# Patient Record
Sex: FEMALE | Race: OTHER
[De-identification: names, ages, dates, MRNs, and addresses within clinical notes are randomized per-mention and may not be internally consistent; named-entity substitution may affect disease eponyms.]

---

## 2018-11-04 ENCOUNTER — HOSPITAL ENCOUNTER (EMERGENCY)
Dept: HOSPITAL 80 - FED | Age: 43
Discharge: HOME | End: 2018-11-04
Payer: MEDICAID

## 2018-11-04 VITALS — DIASTOLIC BLOOD PRESSURE: 64 MMHG | SYSTOLIC BLOOD PRESSURE: 110 MMHG

## 2018-11-04 DIAGNOSIS — E86.9: ICD-10-CM

## 2018-11-04 DIAGNOSIS — Z90.49: ICD-10-CM

## 2018-11-04 DIAGNOSIS — R10.31: Primary | ICD-10-CM

## 2018-11-04 LAB — PLATELET # BLD: 214 10^3/UL (ref 150–400)

## 2018-11-04 NOTE — EDPHY
H & P


Stated Complaint: R abd pain since Fri; no n/v/d; feels bloated


Time Seen by Provider: 11/04/18 10:55


HPI/ROS: 





CHIEF COMPLAINT:  Right lower quadrant pain





HISTORY OF PRESENT ILLNESS:  43-year-old female presents with right lower 

quadrant pain.  Onset of generalized abdominal pain and bloating 2 days ago.  

The pain is moderate and has migrated to the right lower quadrant.  Associated 

with dysuria, lack of appetite and chills.  No nausea, vomiting or diarrhea.  

No prior similar symptoms.





REVIEW OF SYSTEMS:  complete 10 point ROS reviewed and is negative except for 

the noted elements in the HPI








- Personal History


LMP (Females 10-55): Over 28 Days Ago


Current Tetanus Diphtheria and Acellular Pertussis (TDAP): Yes


Tetanus Vaccine Date: 2011





- Medical/Surgical History


Hx Asthma: No


Hx Chronic Respiratory Disease: No


Hx Diabetes: No


Hx Cardiac Disease: No


Hx Renal Disease: No


Hx Cirrhosis: No


Hx Alcoholism: No


Hx HIV/AIDS: No


Hx Splenectomy or Spleen Trauma: No


Other PMH: HX:  Cholecystectomy, OBESITY, Seizure disorder, CHRONIC NECK AND 

ARM PAIN, Csection





- Social History


Smoking Status: Never smoked


Drug Use: None





- Physical Exam


Exam: 





General Appearance:  Alert, pleasant


Eyes:  Pupils equal and round, no conjunctival pallor


ENT, Mouth:  Mucous membranes moist


Neck:  Normal inspection


Respiratory:  Lungs are clear to auscultation


Cardiovascular:  Regular rate and rhythm


Gastrointestinal:  Abdomen is soft, right lower quadrant tenderness, no 

peritoneal signs


Neurological:  A&O, nonfocal exam


Skin:  Warm and dry, no rash


Extremities:  Normal inspection


Psychiatric:  Mood and affect normal





Constitutional: 


 Initial Vital Signs











Temperature (C)  36.6 C   11/04/18 10:48


 


Heart Rate  61   11/04/18 10:48


 


Respiratory Rate  18   11/04/18 10:48


 


Blood Pressure  123/64 H  11/04/18 10:48


 


O2 Sat (%)  98   11/04/18 10:48








 











O2 Delivery Mode               Room Air














Allergies/Adverse Reactions: 


 





hydrocodone Allergy (Unknown, Verified 11/04/18 10:47)


 


naproxen Allergy (Unknown, Verified 11/04/18 10:53)


 








Home Medications: 














 Medication  Instructions  Recorded


 


Lamictal    08/26/12


 


Topiramate  05/18/14


 


Albuterol [Proventil Neb] 3 ml IH 11/04/18


 


Ondansetron Odt [Zofran Odt 4 mg 4 mg PO Q4 PRN #6 tab 11/04/18





(*)]  














Medical Decision Making





- Diagnostics


Imaging Results: 


 Imaging Impressions





Abdomen CT  11/04/18 12:01


Impression: 


1. Normal appendix. No appendicitis.


2. Status post cholecystectomy. No biliary dilation.


3. Hepatic steatosis.


4. Constipation. 


 


Findings discussed with emergency department physician, Dee Knight MD on 

November 4, 2018 at 12:57 p.m.











Imaging: Discussed imaging studies w/ On call Radiologist, I viewed and 

interpreted images myself


ED Course/Re-evaluation: 





This patient presents with right lower quadrant pain.  Clinical presentation 

concerning for acute appendicitis.  CT scan of the abdomen pelvis ordered.  

Morphine 4 mg IV given for pain control.  Discussed with patient allergy to 

Naprosyn.  She develops an aura of seizure with Naprosyn, no true allergic 

reaction.





CT scan reveals no evidence of appendicitis.  Results discussed with the 

patient.  Feels much better overall.  Nausea has resolved and abdominal pain is 

very slight.  Abdomen is soft with minimal right lower quadrant tenderness.  

Given normal CT scan, no leukocytosis and normal urinalysis, will discharge 

home.  The patient was encouraged to follow up with her PCP in 1-2 days for 

recheck.  Will take ibuprofen as needed for abdominal pain.  Zofran prescribed 

for nausea.


Differential Diagnosis: 





Differential diagnosis includes though it is not limited to appendicitis, 

cholecystitis, diverticulitis, pyelonephritis, bowel perforation, small bowel 

obstruction.





- Data Points


Laboratory Results: 


 Laboratory Results





 11/04/18 11:10 





 11/04/18 11:10 





 











  11/04/18 11/04/18 11/04/18





  11:30 11:10 11:10


 


WBC      





    


 


RBC      





    


 


Hgb      





    


 


POC Hgb  15.0 gm/dL gm/dL    





   (12.6-16.3)   


 


Hct      





    


 


POC Hct  44 % %    





   (38-47)   


 


MCV      





    


 


MCH      





    


 


MCHC      





    


 


RDW      





    


 


Plt Count      





    


 


MPV      





    


 


Neut % (Auto)      





    


 


Lymph % (Auto)      





    


 


Mono % (Auto)      





    


 


Eos % (Auto)      





    


 


Baso % (Auto)      





    


 


Nucleat RBC Rel Count      





    


 


Absolute Neuts (auto)      





    


 


Absolute Lymphs (auto)      





    


 


Absolute Monos (auto)      





    


 


Absolute Eos (auto)      





    


 


Absolute Basos (auto)      





    


 


Absolute Nucleated RBC      





    


 


Immature Gran %      





    


 


Immature Gran #      





    


 


POC Sodium  140 mEq/L mEq/L    





   (135-145)   


 


Sodium      





    


 


POC Potassium  4.2 mEq/L mEq/L    





   (3.3-5.0)   


 


Potassium      





    


 


POC Chloride  109 mEq/L mEq/L    





   ()   


 


Chloride      





    


 


Carbon Dioxide      





    


 


Anion Gap      





    


 


POC BUN  10 mg/dL mg/dL    





   (7-23)   


 


BUN      





    


 


Creatinine      





    


 


POC Creatinine  0.6 mg/dL mg/dL    





   (0.6-1.0)   


 


Estimated GFR      





    


 


Glucose      





    


 


POC Glucose  88 mg/dL mg/dL    





   ()   


 


Calcium      





    


 


Total Bilirubin      





    


 


Conjugated Bilirubin      





    


 


Unconjugated Bilirubin      





    


 


AST      





    


 


ALT      





    


 


Alkaline Phosphatase      





    


 


Total Protein      





    


 


Albumin      





    


 


Lipase      





    


 


Beta HCG, Qual      NEGATIVE 





    


 


Urine Color    YELLOW   





    


 


Urine Appearance    MODERATELY TURBID   





    


 


Urine pH    5.0   





    (5.0-7.5)  


 


Ur Specific Gravity    1.027   





    (1.002-1.030)  


 


Urine Protein    1+  H   





    (NEGATIVE)  


 


Urine Ketones    NEGATIVE   





    (NEGATIVE)  


 


Urine Blood    NEGATIVE   





    (NEGATIVE)  


 


Urine Nitrate    NEGATIVE   





    (NEGATIVE)  


 


Urine Bilirubin    NEGATIVE   





    (NEGATIVE)  


 


Urine Urobilinogen    NEGATIVE EU EU  





    (0.2-1.0)  


 


Ur Leukocyte Esterase    NEGATIVE   





    (NEGATIVE)  


 


Urine RBC    1-3 /hpf /hpf  





    (0-3)  


 


Urine WBC    1-3 /hpf /hpf  





    (0-3)  


 


Ur Epithelial Cells    2+ /lpf H /lpf  





    (NONE-1+)  


 


Urine Bacteria    TRACE /hpf H /hpf  





    (NONE SEEN)  


 


Urine Mucus    TRACE /lpf /lpf  





    (NONE-1+)  


 


Urine Glucose    NEGATIVE   





    (NEGATIVE)  














  11/04/18 11/04/18





  11:10 11:10


 


WBC    8.60 10^3/uL 10^3/uL





    (3.80-9.50) 


 


RBC    4.87 10^6/uL 10^6/uL





    (4.18-5.33) 


 


Hgb    14.2 g/dL g/dL





    (12.6-16.3) 


 


POC Hgb    





   


 


Hct    42.0 % %





    (38.0-47.0) 


 


POC Hct    





   


 


MCV    86.2 fL fL





    (81.5-99.8) 


 


MCH    29.2 pg pg





    (27.9-34.1) 


 


MCHC    33.8 g/dL g/dL





    (32.4-36.7) 


 


RDW    13.2 % %





    (11.5-15.2) 


 


Plt Count    214 10^3/uL 10^3/uL





    (150-400) 


 


MPV    10.3 fL fL





    (8.7-11.7) 


 


Neut % (Auto)    54.2 % %





    (39.3-74.2) 


 


Lymph % (Auto)    38.5 % %





    (15.0-45.0) 


 


Mono % (Auto)    6.3 % %





    (4.5-13.0) 


 


Eos % (Auto)    0.6 % %





    (0.6-7.6) 


 


Baso % (Auto)    0.2 % L %





    (0.3-1.7) 


 


Nucleat RBC Rel Count    0.0 % %





    (0.0-0.2) 


 


Absolute Neuts (auto)    4.66 10^3/uL 10^3/uL





    (1.70-6.50) 


 


Absolute Lymphs (auto)    3.31 10^3/uL H 10^3/uL





    (1.00-3.00) 


 


Absolute Monos (auto)    0.54 10^3/uL 10^3/uL





    (0.30-0.80) 


 


Absolute Eos (auto)    0.05 10^3/uL 10^3/uL





    (0.03-0.40) 


 


Absolute Basos (auto)    0.02 10^3/uL 10^3/uL





    (0.02-0.10) 


 


Absolute Nucleated RBC    0.00 10^3/uL 10^3/uL





    (0-0.01) 


 


Immature Gran %    0.2 % %





    (0.0-1.1) 


 


Immature Gran #    0.02 10^3/uL 10^3/uL





    (0.00-0.10) 


 


POC Sodium    





   


 


Sodium  139 mEq/L mEq/L  





   (135-145)  


 


POC Potassium    





   


 


Potassium  4.5 mEq/L mEq/L  





   (3.3-5.0)  


 


POC Chloride    





   


 


Chloride  108 mEq/L mEq/L  





   ()  


 


Carbon Dioxide  20 mEq/l L mEq/l  





   (22-31)  


 


Anion Gap  11 mEq/L mEq/L  





   (6-14)  


 


POC BUN    





   


 


BUN  10 mg/dL mg/dL  





   (7-23)  


 


Creatinine  0.7 mg/dL mg/dL  





   (0.6-1.0)  


 


POC Creatinine    





   


 


Estimated GFR  > 60   





   


 


Glucose  90 mg/dL mg/dL  





   ()  


 


POC Glucose    





   


 


Calcium  9.2 mg/dL mg/dL  





   (8.5-10.4)  


 


Total Bilirubin  0.3 mg/dL mg/dL  





   (0.1-1.4)  


 


Conjugated Bilirubin  0.2 mg/dL mg/dL  





   (0.0-0.5)  


 


Unconjugated Bilirubin  0.1 mg/dL mg/dL  





   (0.0-1.1)  


 


AST  26 IU/L IU/L  





   (14-46)  


 


ALT  38 IU/L IU/L  





   (9-52)  


 


Alkaline Phosphatase  56 IU/L IU/L  





   ()  


 


Total Protein  7.9 g/dL g/dL  





   (6.3-8.2)  


 


Albumin  4.3 g/dL g/dL  





   (3.5-5.0)  


 


Lipase  72 IU/L IU/L  





   ()  


 


Beta HCG, Qual    





   


 


Urine Color    





   


 


Urine Appearance    





   


 


Urine pH    





   


 


Ur Specific Gravity    





   


 


Urine Protein    





   


 


Urine Ketones    





   


 


Urine Blood    





   


 


Urine Nitrate    





   


 


Urine Bilirubin    





   


 


Urine Urobilinogen    





   


 


Ur Leukocyte Esterase    





   


 


Urine RBC    





   


 


Urine WBC    





   


 


Ur Epithelial Cells    





   


 


Urine Bacteria    





   


 


Urine Mucus    





   


 


Urine Glucose    





   











Medications Given: 


 








Discontinued Medications





Sodium Chloride (Ns)  1,000 mls @ 0 mls/hr IV EDNOW ONE; [facility]


   PRN Reason: Protocol


   Stop: 11/04/18 11:17


   Last Admin: 11/04/18 11:21 Dose:  1,000 mls


Morphine Sulfate (Morphine)  4 mg IVP EDNOW ONE


   Stop: 11/04/18 11:17


   Last Admin: 11/04/18 11:22 Dose:  4 mg


Ondansetron HCl (Zofran)  4 mg IVP EDNOW ONE


   Stop: 11/04/18 11:17


   Last Admin: 11/04/18 11:22 Dose:  4 mg





Point of Care Test Results: 


 Chemistry











  11/04/18





  11:30


 


POC Sodium  140 mEq/L mEq/L





   (135-145) 


 


POC Potassium  4.2 mEq/L mEq/L





   (3.3-5.0) 


 


POC Chloride  109 mEq/L mEq/L





   () 


 


POC BUN  10 mg/dL mg/dL





   (7-23) 


 


POC Creatinine  0.6 mg/dL mg/dL





   (0.6-1.0) 


 


POC Glucose  88 mg/dL mg/dL





   () 








 ISTAT H&H











  11/04/18





  11:30


 


POC Hgb  15.0 gm/dL gm/dL





   (12.6-16.3) 


 


POC Hct  44 % %





   (38-47) 














Departure





- Departure


Disposition: Home, Routine, Self-Care


Clinical Impression: 


Abdominal pain


Qualifiers:


 Abdominal location: right lower quadrant Qualified Code(s): R10.31 - Right 

lower quadrant pain





Condition: Good


Instructions:  Acute Abdominal Pain (ED)


Additional Instructions: 


Sometimes we are unable to diagnose an obvious cause of abdominal pain in the 

Emergency Department.  Based upon our evaluation today, we see no obvious 

explanation for your pain.  Because more serious conditions can be difficult to 

diagnose early in the course of their presentation, we ask that you return to 

the Emergency Department in 12-24 hours for a recheck if you are still having 

pain.  This is necessary to exclude the development of a more serious condition 

such as appendicitis or other intra-abdominal emergency.  In the event your 

pain markedly increases before that time or you develop intractable vomiting or 

fever return to the Emergency Department immediately.


*****************************


Algunas veces no es posible diagnosticar americo causa obvia de dolor abdominal en 

el Dept. de Emergencia. Basado en la evaluacion de hoaspen, no vemos americo 

explicacion obvia por benoit dolor. Por que las condiciones mas serias pueden ser 

dificil de diagnosticas en el curso temprano de benoit presentacion, le pedimos que 

regrese a la garett de emergencia en 12-24 horas para otra evaluacion si todavia 

tiene dolor. Socorro es necesario para excluir el desarrollo de americo condicion mas 

seria joanne apendicitir u otra emergencia intra-abdominal. En el evento de que 

benoit dolor marcadamente incrementa antes de eso o si desarrolla vomito 

intractable o fiebre regrese a la garett de emergencia de inmediato. 


Referrals: 


[facility],. [Clinic] - 1-2 days without fail


Prescriptions: 


Ondansetron Odt [Zofran Odt 4 mg (*)] 4 mg PO Q4 PRN #6 tab


 PRN Reason: Nausea